# Patient Record
Sex: MALE | Race: WHITE | NOT HISPANIC OR LATINO | Employment: FULL TIME | URBAN - METROPOLITAN AREA
[De-identification: names, ages, dates, MRNs, and addresses within clinical notes are randomized per-mention and may not be internally consistent; named-entity substitution may affect disease eponyms.]

---

## 2020-03-05 PROBLEM — Z72.0 TOBACCO ABUSE: Status: ACTIVE | Noted: 2020-03-05

## 2020-03-05 PROBLEM — J18.9 PNEUMONIA: Status: ACTIVE | Noted: 2020-03-05

## 2020-03-05 PROBLEM — R79.89 ELEVATED TROPONIN: Status: ACTIVE | Noted: 2020-03-05

## 2020-03-05 PROBLEM — J18.9 PNEUMONIA DUE TO INFECTIOUS ORGANISM: Status: ACTIVE | Noted: 2020-03-05

## 2020-03-06 PROBLEM — R91.8 PULMONARY NODULES: Status: ACTIVE | Noted: 2020-03-06

## 2020-03-06 PROBLEM — J96.01 ACUTE HYPOXEMIC RESPIRATORY FAILURE: Status: ACTIVE | Noted: 2020-03-06

## 2020-03-06 PROBLEM — E87.1 HYPONATREMIA: Status: ACTIVE | Noted: 2020-03-06

## 2020-03-07 PROBLEM — K59.00 CONSTIPATION: Status: ACTIVE | Noted: 2020-03-07

## 2020-06-01 PROBLEM — I10 ESSENTIAL HYPERTENSION: Status: ACTIVE | Noted: 2020-06-01

## 2021-03-06 ENCOUNTER — IMMUNIZATION (OUTPATIENT)
Dept: PRIMARY CARE CLINIC | Facility: CLINIC | Age: 61
End: 2021-03-06

## 2021-03-06 DIAGNOSIS — Z23 NEED FOR VACCINATION: Primary | ICD-10-CM

## 2021-03-06 PROCEDURE — 0031A COVID-19,VECTOR-NR,RS-AD26,PF,0.5 ML DOSE VACCINE (JANSSEN): CPT | Mod: CV19,S$GLB,, | Performed by: FAMILY MEDICINE

## 2021-03-06 PROCEDURE — 91303 COVID-19,VECTOR-NR,RS-AD26,PF,0.5 ML DOSE VACCINE (JANSSEN): CPT | Mod: S$GLB,,, | Performed by: FAMILY MEDICINE

## 2021-03-06 PROCEDURE — 91303 COVID-19,VECTOR-NR,RS-AD26,PF,0.5 ML DOSE VACCINE (JANSSEN): ICD-10-PCS | Mod: S$GLB,,, | Performed by: FAMILY MEDICINE

## 2021-03-06 PROCEDURE — 0031A COVID-19,VECTOR-NR,RS-AD26,PF,0.5 ML DOSE VACCINE (JANSSEN): ICD-10-PCS | Mod: CV19,S$GLB,, | Performed by: FAMILY MEDICINE

## 2022-01-09 PROBLEM — U07.1 HYPERCOAGULABLE STATE ASSOCIATED WITH COVID-19: Status: ACTIVE | Noted: 2022-01-09

## 2022-01-09 PROBLEM — U07.1 ACUTE HYPOXEMIC RESPIRATORY FAILURE DUE TO COVID-19: Status: ACTIVE | Noted: 2020-03-06

## 2022-01-09 PROBLEM — D68.69 HYPERCOAGULABLE STATE ASSOCIATED WITH COVID-19: Status: ACTIVE | Noted: 2022-01-09

## 2022-01-09 PROBLEM — B97.89 VIRAL SEPSIS: Status: ACTIVE | Noted: 2022-01-09

## 2022-01-09 PROBLEM — A41.89 VIRAL SEPSIS: Status: ACTIVE | Noted: 2022-01-09

## 2022-01-10 DIAGNOSIS — U07.1 COVID-19 VIRUS DETECTED: ICD-10-CM

## 2023-08-14 PROBLEM — J43.2 CENTRILOBULAR EMPHYSEMA: Status: ACTIVE | Noted: 2023-08-14

## 2023-08-14 PROBLEM — J44.9 COPD, SEVERE: Status: ACTIVE | Noted: 2023-08-14

## 2023-08-14 PROBLEM — F17.200 TOBACCO DEPENDENCE: Status: ACTIVE | Noted: 2023-08-14

## 2025-01-05 PROBLEM — J44.1 COPD EXACERBATION: Status: ACTIVE | Noted: 2025-01-05

## 2025-01-05 PROBLEM — E78.5 HYPERLIPIDEMIA: Status: ACTIVE | Noted: 2025-01-05

## 2025-01-06 PROBLEM — J96.21 ACUTE ON CHRONIC RESPIRATORY FAILURE WITH HYPOXEMIA: Status: ACTIVE | Noted: 2025-01-06

## 2025-04-01 ENCOUNTER — OFFICE VISIT (OUTPATIENT)
Dept: FAMILY MEDICINE | Facility: CLINIC | Age: 65
End: 2025-04-01
Payer: COMMERCIAL

## 2025-04-01 VITALS
BODY MASS INDEX: 23.32 KG/M2 | DIASTOLIC BLOOD PRESSURE: 68 MMHG | RESPIRATION RATE: 18 BRPM | WEIGHT: 153.88 LBS | HEART RATE: 98 BPM | SYSTOLIC BLOOD PRESSURE: 122 MMHG | HEIGHT: 68 IN | OXYGEN SATURATION: 96 %

## 2025-04-01 DIAGNOSIS — Z12.5 SCREENING PSA (PROSTATE SPECIFIC ANTIGEN): ICD-10-CM

## 2025-04-01 DIAGNOSIS — N52.9 ERECTILE DYSFUNCTION, UNSPECIFIED ERECTILE DYSFUNCTION TYPE: ICD-10-CM

## 2025-04-01 DIAGNOSIS — J43.1 PANLOBULAR EMPHYSEMA: ICD-10-CM

## 2025-04-01 DIAGNOSIS — Z12.11 SCREEN FOR COLON CANCER: ICD-10-CM

## 2025-04-01 DIAGNOSIS — R22.2 SUBCUTANEOUS NODULE OF BACK: ICD-10-CM

## 2025-04-01 DIAGNOSIS — R91.8 PULMONARY NODULES: ICD-10-CM

## 2025-04-01 DIAGNOSIS — E78.2 MIXED HYPERLIPIDEMIA: ICD-10-CM

## 2025-04-01 DIAGNOSIS — K80.20 CALCULUS OF GALLBLADDER WITHOUT CHOLECYSTITIS WITHOUT OBSTRUCTION: ICD-10-CM

## 2025-04-01 DIAGNOSIS — R73.09 ELEVATED GLUCOSE: ICD-10-CM

## 2025-04-01 DIAGNOSIS — I25.10 CORONARY ATHEROSCLEROSIS DUE TO CALCIFIED CORONARY LESION: ICD-10-CM

## 2025-04-01 DIAGNOSIS — J96.11 CHRONIC RESPIRATORY FAILURE WITH HYPOXIA: ICD-10-CM

## 2025-04-01 DIAGNOSIS — Z23 IMMUNIZATION DUE: ICD-10-CM

## 2025-04-01 DIAGNOSIS — Z00.00 WELLNESS EXAMINATION: ICD-10-CM

## 2025-04-01 DIAGNOSIS — G47.34 NOCTURNAL OXYGEN DESATURATION: ICD-10-CM

## 2025-04-01 DIAGNOSIS — I25.84 CORONARY ATHEROSCLEROSIS DUE TO CALCIFIED CORONARY LESION: ICD-10-CM

## 2025-04-01 DIAGNOSIS — Z99.81 DEPENDENCE ON NOCTURNAL OXYGEN THERAPY: ICD-10-CM

## 2025-04-01 DIAGNOSIS — I51.89 DIASTOLIC DYSFUNCTION WITHOUT HEART FAILURE: ICD-10-CM

## 2025-04-01 DIAGNOSIS — E78.49 FAMILIAL HYPERLIPIDEMIA: ICD-10-CM

## 2025-04-01 DIAGNOSIS — R53.83 FATIGUE, UNSPECIFIED TYPE: Primary | ICD-10-CM

## 2025-04-01 DIAGNOSIS — M79.89 NODULE OF SOFT TISSUE: ICD-10-CM

## 2025-04-01 PROCEDURE — 99999 PR PBB SHADOW E&M-EST. PATIENT-LVL IV: CPT | Mod: PBBFAC,,, | Performed by: INTERNAL MEDICINE

## 2025-04-01 RX ORDER — SILDENAFIL 100 MG/1
100 TABLET, FILM COATED ORAL DAILY PRN
Qty: 30 TABLET | Refills: 1 | Status: SHIPPED | OUTPATIENT
Start: 2025-04-01 | End: 2026-04-01

## 2025-04-01 NOTE — PROGRESS NOTES
Subjective:       Patient ID: Davide Orta is a 64 y.o. male.    Chief Complaint: Establish Care   HPI     History of Present Illness    CHIEF COMPLAINT:  Patient presents for an initial visit with a new PCP to establish care and discuss ongoing management of his COPD and other health concerns.    HPI:  Patient is a 63-year-old male with a history of COPD. He reports dyspnea on exertion, noting difficulty breathing when walking to his mailbox. He uses nocturnal oxygen therapy, which he has had for approximately 1 year. He recently underwent a sleep study and a 6-minute walk test with his pulmonologist, Dr. Wynn. The results showed SpO2 levels of 86-87-92 on 3 L of continuous oxygen, qualifying him for home oxygen. He is currently awaiting a portable oxygen machine prescription.    He reports attending pulmonary rehabilitation at the [facility], using 3 L of oxygen while on the treadmill. He and his wife purchased a refurbished portable oxygen concentrator for use at the gym, where he exercises 3 days a week, primarily using the treadmill.    He notes two areas of concern: a mass on his right forearm present for about 1-2 years, and another on his upper back that appeared more recently, within the last year.     Regarding sexual health, he reports achieving only partial erections. He has previously used sildenafil at 100mg dosage, which he found effective, and 50mg dosage, which required taking two pills for efficacy.    He denies any current chest pain.    MEDICAL HISTORY:  Patient has a history of COPD, diagnosed in 2020, emphysema, coronary artery disease, pulmonary nodules, and gallstone.   Patient recently received a flu vaccine and a pneumonia vaccine (Prevnar 21) at [facility]. He also had a tetanus booster in 2014.    TEST RESULTS:  In January, the patient's non-fasting glucose level was 140 mg/dL. His cholesterol test in November 2023 showed an LDL of 260 mg/dL, a significant increase from 62 mg/dL  measured five years prior.   Patient underwent a six-minute walk test, achieving SpO2 levels of 86-87-92 on 3 L of continuous oxygen. He also had an overnight sleep test. An echocardiogram was performed, showing good heart squeeze and overall positive results.    IMAGING:  A CT Lung Screen last year revealed emphysema changes, pulmonary nodules, and coronary artery calcifications. I  .         PSA: ordered   Colonoscopy:  defers colon, ordered cologaurd   Immunizations: Flu: Tdap: 2025 Prevnar: 2025 Shingles: due next   Smoker: yes has cut down from 2 packs cessation encouraged// 25' CT emphysema, pulmonary nodules    New to clinic establish care  Multiple various complaints has not seen primary care in years      Complains of soft tissue nodule Right forearm nodule - 1-2 yrs as well as upper mid back.  Has never had imaging.  Like to get workup    ED mild weaker has use p.r.n. Viagra.  Would like to test testosterone     COPD emphysema/respiratory failure:  Nocturnal now recently diagnosed with exercise.  Awaiting portable oxygen machine.  Still smoking.  Rx see list  Exposure to toxic fumes in the past    Mgmt Dr Kingsley wood     65 yo male having smoked 2 ppd for about 40 years and now done to 0.5 ppd for past 5 years       Goes to gym on MWF - using portable oxygen while working out.  Using nocturnal oxygen at 2 L/min.     PFTs 2023 revealed severe obstruction FEV1 of 1.06 L or 32% of predicted.  Lung volumes reveal a mild restrictive physiology with a TLC of 4.25 L or 63% of predicted.  DLCO is severely reduced at 8.17 or 32% c/w severely reduced DLCO which may be 2/2 pulmonary parenchymal abnormalities, pulmonary hypertension, and anemia.    2025  Six minute walk - repeat exercise oximetry in office today.       SpO2% During Exertion on O2: 87               Heart Rate on O2 (bpm): 101                          Exertion O2 LPM: 2  Continuous/Pulse: Continuous     SpO2% During Exertion on O2: 92                Heart Rate on O2 (bpm): 96                          Exertion O2 LPM: 3  Continuous/Pulse: Continuous      Qualifies for home oxygen with exertion   CT chest 10/2024 with stable pulmonary nodules and centrilobular emphysema.    Mixed hyperlipidemia/familial LDL:  Highest .  Cardio recommended Crestor patient defers wife concerned with side effect profile started CholestOff never followed up with additional lipid panel currently not taking cholesterol supplement.  Does have coronary artery calcifications as well as aortic atherosclerosis.    Mgmt Cardio Dr Ardon   Echo shows Diastolic dysfunction with preserved ejection fraction.  Normal ejection fraction, mild aortic sclerosis    CT shows gallstones not symptomatic         ____________________________________________________________________________________________________  Assessment & Plan:  1. Fatigue, unspecified type  - TESTOSTERONE, FREE (DIALYSIS) AND TOTAL, LC/MS/MS; Future  - Follicle Stimulating Hormone; Future    2. Panlobular emphysema    3. Nocturnal oxygen desaturation    4. Chronic respiratory failure with hypoxia    5. Dependence on nocturnal oxygen therapy    6. Nodule of soft tissue  - US Extremity Non Vascular Limited Right; Future    7. Erectile dysfunction, unspecified erectile dysfunction type  - TESTOSTERONE, FREE (DIALYSIS) AND TOTAL, LC/MS/MS; Future  - Follicle Stimulating Hormone; Future  - sildenafiL (VIAGRA) 100 MG tablet; Take 1 tablet (100 mg total) by mouth daily as needed for Erectile Dysfunction.  Dispense: 30 tablet; Refill: 1    8. Elevated glucose  - Hemoglobin A1C; Future    9. Subcutaneous nodule of back  - US Soft Tissue Chest_Upper Back; Future    10. Mixed hyperlipidemia  - Lipid Panel; Future  - Lipoprotein A (LPA); Future    11. Diastolic dysfunction without heart failure    12. Coronary atherosclerosis due to calcified coronary lesion    13. Pulmonary nodules    14. Calculus of gallbladder without cholecystitis  without obstruction    15. Wellness examination  - Hemoglobin A1C; Future  - Lipid Panel; Future  - TSH; Future  - PSA, Screening; Future  - Lipoprotein A (LPA); Future  - TESTOSTERONE, FREE (DIALYSIS) AND TOTAL, LC/MS/MS; Future  - Follicle Stimulating Hormone; Future    16. Screening PSA (prostate specific antigen)  - PSA, Screening; Future    17. Screen for colon cancer  - Cologuard Screening (Multitarget Stool DNA); Future  - Cologuard Screening (Multitarget Stool DNA)    18. Immunization due  - DIPH,PERTUSS(ACEL),TET VAC(PF)(ADULT)(ADACEL)(TDaP)    19. Familial hyperlipidemia     Fatigue, unspecified type  -     TESTOSTERONE, FREE (DIALYSIS) AND TOTAL, LC/MS/MS; Future; Expected date: 04/01/2025  -     Follicle Stimulating Hormone; Future; Expected date: 04/01/2025    Panlobular emphysema    Nocturnal oxygen desaturation    Chronic respiratory failure with hypoxia    Dependence on nocturnal oxygen therapy    Nodule of soft tissue  -     US Extremity Non Vascular Limited Right; Future; Expected date: 04/01/2025    Erectile dysfunction, unspecified erectile dysfunction type  -     TESTOSTERONE, FREE (DIALYSIS) AND TOTAL, LC/MS/MS; Future; Expected date: 04/01/2025  -     Follicle Stimulating Hormone; Future; Expected date: 04/01/2025  -     sildenafiL (VIAGRA) 100 MG tablet; Take 1 tablet (100 mg total) by mouth daily as needed for Erectile Dysfunction.  Dispense: 30 tablet; Refill: 1    Elevated glucose  -     Hemoglobin A1C; Future; Expected date: 04/01/2025    Subcutaneous nodule of back  -     US Soft Tissue Chest_Upper Back; Future; Expected date: 04/01/2025    Mixed hyperlipidemia  -     Lipid Panel; Future; Expected date: 04/01/2025  -     Lipoprotein A (LPA); Future; Expected date: 04/01/2025    Diastolic dysfunction without heart failure    Coronary atherosclerosis due to calcified coronary lesion    Pulmonary nodules    Calculus of gallbladder without cholecystitis without obstruction    Wellness  examination  -     Hemoglobin A1C; Future; Expected date: 04/01/2025  -     Lipid Panel; Future; Expected date: 04/01/2025  -     TSH; Future; Expected date: 04/01/2025  -     PSA, Screening; Future; Expected date: 04/01/2025  -     Lipoprotein A (LPA); Future; Expected date: 04/01/2025  -     TESTOSTERONE, FREE (DIALYSIS) AND TOTAL, LC/MS/MS; Future; Expected date: 04/01/2025  -     Follicle Stimulating Hormone; Future; Expected date: 04/01/2025    Screening PSA (prostate specific antigen)  -     PSA, Screening; Future; Expected date: 04/01/2025    Screen for colon cancer  -     Cologuard Screening (Multitarget Stool DNA); Future; Expected date: 04/01/2025    Immunization due  -     DIPH,PERTUSS(ACEL),TET VAC(PF)(ADULT)(ADACEL)(TDaP)    Familial hyperlipidemia        Continue to work on maintain healthy weight, balanced diet. Avoid unhealthy habits: smoking/vaping, excessive alcohol intake.     Recommend diet exercise:  High protein, low fat, low carb diet - calories women under <1200 & men <1800, carbs <100 G, protein 50-60 G daily. Protein supplements to replace one meal.  Keep all beverages < 10 calories per serving. Keep snacks < 100 calories.   Recommend exercise 160 minutes per week, combo of cardio and weight/strength training.     Visit today included increased complexity associated with the care of the episodic problem addressed and managing the longitudinal care of the patient due to the serious and/or complex managed problem(s). Copd       Disclaimer: This note was partly generated using dictation software which may occasionally result in transcription errors  ____________________________________________________________________________________________________  Review of Systems:  Review of Systems   Constitutional:  Positive for fatigue. Negative for chills.   HENT:  Negative for drooling.    Eyes:  Negative for pain.   Respiratory:  Positive for shortness of breath. Negative for choking.   "  Cardiovascular:  Negative for chest pain.   Gastrointestinal:  Negative for blood in stool.   Genitourinary:  Negative for hematuria.   Musculoskeletal:  Negative for joint swelling.   Skin:  Negative for pallor.   Neurological:  Negative for facial asymmetry.   Psychiatric/Behavioral:  Negative for confusion.            Objective:     Wt Readings from Last 3 Encounters:   04/01/25 69.8 kg (153 lb 14.1 oz)   03/25/25 68 kg (150 lb)   02/17/25 68 kg (150 lb)     BP Readings from Last 3 Encounters:   04/01/25 122/68   03/25/25 122/68   02/11/25 120/70       Lab Results   Component Value Date    WBC 8.03 01/07/2025    HGB 14.2 01/07/2025    HCT 45.0 01/07/2025     01/07/2025     01/07/2025    K 4.8 01/07/2025     01/07/2025    ALT 14 01/07/2025    AST 26 01/07/2025    CO2 31 (H) 01/07/2025    CREATININE 0.66 01/07/2025    BUN 14 01/07/2025     (H) 01/07/2025      No results found for: "HGBA1C"   No results found for: "TSH"  No results found for: "FREET4"  Lab Results   Component Value Date    LDLCALC 260.0 (H) 11/21/2023    LDLCALC 62.4 (L) 03/07/2020     Lab Results   Component Value Date    TRIG 245 (H) 11/21/2023    TRIG 173 (H) 03/07/2020            Physical Exam  Constitutional:       Appearance: Normal appearance.      Comments: Not wearing oxygen on exam   HENT:      Head: Normocephalic and atraumatic.   Eyes:      Extraocular Movements: Extraocular movements intact.      Conjunctiva/sclera: Conjunctivae normal.      Pupils: Pupils are equal, round, and reactive to light.   Cardiovascular:      Rate and Rhythm: Normal rate.   Pulmonary:      Effort: Pulmonary effort is normal.   Skin:            Comments: 2 areas soft tissue lesion, 1 cm.  Appear to be lipomas.   Neurological:      Mental Status: He is alert and oriented to person, place, and time.   Psychiatric:         Mood and Affect: Mood normal.               Medication List with Changes/Refills   New Medications    SILDENAFIL " (VIAGRA) 100 MG TABLET    Take 1 tablet (100 mg total) by mouth daily as needed for Erectile Dysfunction.   Current Medications    ALBUTEROL (PROVENTIL/VENTOLIN HFA) 90 MCG/ACTUATION INHALER    INHALE 2 PUFFS INTO THE LUNGS EVERY 6 HOURS AS NEEDED FOR WHEEZING OR SHORTNESS OF BREATH RESCUE    ALBUTEROL-IPRATROPIUM (DUO-NEB) 2.5 MG-0.5 MG/3 ML NEBULIZER SOLUTION    Take 3 mLs by nebulization every 6 (six) hours as needed for Wheezing or Shortness of Breath (chest tightness). Rescue    BUDESONIDE (PULMICORT) 0.5 MG/2 ML NEBULIZER SOLUTION    Take 0.5 mg by nebulization 2 (two) times daily as needed (for wheezing and shortness of breath).    FLUTICASONE-UMECLIDIN-VILANTER (TRELEGY ELLIPTA) 100-62.5-25 MCG DSDV    Inhale 1 puff into the lungs once daily.    PREDNISONE (DELTASONE) 20 MG TABLET    Take 1 tablet (20 mg total) by mouth once daily.    SODIUM CHLORIDE 3% 3 % NEBULIZER SOLUTION    Take 4 mLs by nebulization 2 (two) times a day.

## 2025-04-16 ENCOUNTER — RESULTS FOLLOW-UP (OUTPATIENT)
Dept: FAMILY MEDICINE | Facility: CLINIC | Age: 65
End: 2025-04-16

## 2025-04-21 ENCOUNTER — HOSPITAL ENCOUNTER (OUTPATIENT)
Dept: RADIOLOGY | Facility: HOSPITAL | Age: 65
Discharge: HOME | End: 2025-04-21
Attending: INTERNAL MEDICINE
Payer: COMMERCIAL

## 2025-04-21 DIAGNOSIS — M79.89 NODULE OF SOFT TISSUE: ICD-10-CM

## 2025-04-21 DIAGNOSIS — R22.2 SUBCUTANEOUS NODULE OF BACK: ICD-10-CM

## 2025-04-21 PROCEDURE — 76882 US LMTD JT/FCL EVL NVASC XTR: CPT | Mod: TC,PO,RT

## 2025-04-21 PROCEDURE — 76604 US EXAM CHEST: CPT | Mod: TC,PO

## 2025-04-21 PROCEDURE — 76882 US LMTD JT/FCL EVL NVASC XTR: CPT | Mod: 26,RT,, | Performed by: RADIOLOGY

## 2025-04-21 PROCEDURE — 76604 US EXAM CHEST: CPT | Mod: 26,,, | Performed by: RADIOLOGY

## 2025-04-22 ENCOUNTER — LAB VISIT (OUTPATIENT)
Dept: LAB | Facility: HOSPITAL | Age: 65
End: 2025-04-22
Attending: INTERNAL MEDICINE
Payer: COMMERCIAL

## 2025-04-22 DIAGNOSIS — J44.9 CHRONIC OBSTRUCTIVE PULMONARY DISEASE, UNSPECIFIED COPD TYPE: ICD-10-CM

## 2025-04-22 DIAGNOSIS — R73.09 ELEVATED GLUCOSE: ICD-10-CM

## 2025-04-22 DIAGNOSIS — J43.9 PULMONARY EMPHYSEMA, UNSPECIFIED EMPHYSEMA TYPE: ICD-10-CM

## 2025-04-22 DIAGNOSIS — E78.2 MIXED HYPERLIPIDEMIA: ICD-10-CM

## 2025-04-22 DIAGNOSIS — Z12.5 SCREENING PSA (PROSTATE SPECIFIC ANTIGEN): ICD-10-CM

## 2025-04-22 DIAGNOSIS — R53.83 FATIGUE, UNSPECIFIED TYPE: ICD-10-CM

## 2025-04-22 DIAGNOSIS — N52.9 ERECTILE DYSFUNCTION, UNSPECIFIED ERECTILE DYSFUNCTION TYPE: ICD-10-CM

## 2025-04-22 DIAGNOSIS — Z00.00 WELLNESS EXAMINATION: ICD-10-CM

## 2025-04-22 LAB
A1AT SERPL-MCNC: 190 MG/DL (ref 100–190)
CHOLEST SERPL-MCNC: 241 MG/DL (ref 120–199)
CHOLEST/HDLC SERPL: 5.9 {RATIO} (ref 2–5)
EAG (OHS): 114 MG/DL (ref 68–131)
FSH SERPL-ACNC: 7.78 MIU/ML (ref 0.95–11.95)
HBA1C MFR BLD: 5.6 % (ref 4–5.6)
HDLC SERPL-MCNC: 41 MG/DL (ref 40–75)
HDLC SERPL: 17 % (ref 20–50)
LDLC SERPL CALC-MCNC: 168.8 MG/DL (ref 63–159)
NONHDLC SERPL-MCNC: 200 MG/DL
PSA SERPL-MCNC: 2.22 NG/ML
TRIGL SERPL-MCNC: 156 MG/DL (ref 30–150)
TSH SERPL-ACNC: 2.17 UIU/ML (ref 0.4–4)

## 2025-04-22 PROCEDURE — 80061 LIPID PANEL: CPT

## 2025-04-22 PROCEDURE — 82103 ALPHA-1-ANTITRYPSIN TOTAL: CPT

## 2025-04-22 PROCEDURE — 84153 ASSAY OF PSA TOTAL: CPT

## 2025-04-22 PROCEDURE — 83695 ASSAY OF LIPOPROTEIN(A): CPT

## 2025-04-22 PROCEDURE — 36415 COLL VENOUS BLD VENIPUNCTURE: CPT | Mod: PN

## 2025-04-22 PROCEDURE — 83036 HEMOGLOBIN GLYCOSYLATED A1C: CPT

## 2025-04-22 PROCEDURE — 84402 ASSAY OF FREE TESTOSTERONE: CPT

## 2025-04-22 PROCEDURE — 84443 ASSAY THYROID STIM HORMONE: CPT

## 2025-04-22 PROCEDURE — 83001 ASSAY OF GONADOTROPIN (FSH): CPT

## 2025-04-23 ENCOUNTER — PATIENT MESSAGE (OUTPATIENT)
Dept: FAMILY MEDICINE | Facility: CLINIC | Age: 65
End: 2025-04-23
Payer: COMMERCIAL

## 2025-04-25 LAB — W LP(A): 25 MG/DL

## 2025-05-01 LAB
W TESTOSTERONE, FREE: 71.7 PG/ML
W TESTOSTERONE, TOTAL: 648 NG/DL

## 2025-05-05 ENCOUNTER — OFFICE VISIT (OUTPATIENT)
Dept: FAMILY MEDICINE | Facility: CLINIC | Age: 65
End: 2025-05-05
Payer: COMMERCIAL

## 2025-05-05 VITALS
OXYGEN SATURATION: 96 % | WEIGHT: 153.56 LBS | HEART RATE: 82 BPM | SYSTOLIC BLOOD PRESSURE: 122 MMHG | DIASTOLIC BLOOD PRESSURE: 80 MMHG | BODY MASS INDEX: 23.27 KG/M2 | HEIGHT: 68 IN

## 2025-05-05 DIAGNOSIS — Z11.59 NEED FOR HEPATITIS C SCREENING TEST: ICD-10-CM

## 2025-05-05 DIAGNOSIS — R73.02 GLUCOSE INTOLERANCE (IMPAIRED GLUCOSE TOLERANCE): ICD-10-CM

## 2025-05-05 DIAGNOSIS — I25.84 CORONARY ATHEROSCLEROSIS DUE TO CALCIFIED CORONARY LESION: ICD-10-CM

## 2025-05-05 DIAGNOSIS — Z00.00 WELLNESS EXAMINATION: Primary | ICD-10-CM

## 2025-05-05 DIAGNOSIS — I25.10 CORONARY ATHEROSCLEROSIS DUE TO CALCIFIED CORONARY LESION: ICD-10-CM

## 2025-05-05 DIAGNOSIS — E78.49 FAMILIAL COMBINED HYPERLIPIDEMIA: ICD-10-CM

## 2025-05-05 DIAGNOSIS — E78.2 MIXED HYPERLIPIDEMIA: ICD-10-CM

## 2025-05-05 DIAGNOSIS — Z23 IMMUNIZATION DUE: ICD-10-CM

## 2025-05-05 DIAGNOSIS — M79.10 MYALGIA: ICD-10-CM

## 2025-05-05 PROCEDURE — 99999 PR PBB SHADOW E&M-EST. PATIENT-LVL IV: CPT | Mod: PBBFAC,,, | Performed by: INTERNAL MEDICINE

## 2025-05-05 RX ORDER — ZOSTER VACCINE RECOMBINANT, ADJUVANTED 50 MCG/0.5
0.5 KIT INTRAMUSCULAR ONCE
Qty: 1 EACH | Refills: 0 | Status: SHIPPED | OUTPATIENT
Start: 2025-05-05 | End: 2025-05-05

## 2025-05-05 NOTE — PROGRESS NOTES
Subjective:       Patient ID: Davide Orta is a 64 y.o. male.    Chief Complaint: Annual Exam   HPI     History of Present Illness    CHIEF COMPLAINT:  Patient presents today to discuss results of recent tests wellness. , including cholesterol levels and imaging studies.    HPI:  Patient's recent lab results show improvements in cholesterol levels. His total cholesterol decreased from 357 to 300, triglycerides reduced from 245 to 156, and LDL cholesterol lowered from 260 to 168. He reports a history of high cholesterol and has been taking natural vitamin supplements recommended by his spouse to lower his cholesterol levels. He fasted for 12 hours before the most recent labs.  Wife discourage is his use of statins due to side effect profile.  He has never taking cholesterol medicine.      He uses oxygen therapy, sleeping with continuous oxygen at 2 L per night. He also uses a portable oxygen concentrator set at 3 L when exercising at the gym. During a previous smoking cessation rehab program at [facility], he used 3 L of oxygen while walking on a treadmill. He sometimes increases the portable concentrator to 4 L during workouts to improve his recovery time between exercises.    He has a lipoma on his upper back, located at the bottom of his shoulder blade between the shoulder blades. It measures 2.3 cm and is not painful. He expresses some concern about its presence.  Also right upper extremity also lipoma patient wants to monitor for now    A recent CT of the lungs revealed emphysema changes and small nodules, which are being monitored by Dr. Wynn.   The scan also showed coronary calcium buildup in his coronary arteries.    He has previously been evaluated by a cardiologist, Dr. Ardon, who was primarily concerned about his cholesterol levels.  Recommended statin to lower cholesterol levels as well with hospitalization patient defers for now.     He denies chest pain, heart flutters, or chronic  palpitations. He also denies a personal history of diabetes or heart attacks.    MEDICAL HISTORY:  Patient has a history of emphysema and hypercholesterolemia.        PSA: 2.2 ((4/25   Colonoscopy:  4/25 negative cologaurd   Immunizations: Flu: Tdap: 2025 Prevnar: 2025 Shingles:  Recommend  Smoker: yes cessation encouraged// 25' CT emphysema, pulmonary nodules. Mgmt w Pulm        Lab review follow-up    A1c 5.6 glucose 140 prev   Fasting glucose wasn't done       Lipomas on ultrasound recently completed. soft tissue nodule Right forearm nodule - 1-2 yrs as well as upper mid back.       ED mild weaker Rx prn Viagra testosterone was normal     COPD emphysema/chronic respiratory failure:  Nocturnal O2 2 L and pulse dose oxygen for exercise. Still smoking.    Exposure to toxic fumes past.               Mgmt Dr Wynn pulm             PFTs 2023 severe obstruction FEV1 of 1.06 L or 32% of predicted.  Lung volumes mild restrictive TLC of 4.25 L or 63% of predicted.  DLCO severely reduced at 8.17 or 32% c/w severely reduced DLCO         Coronary atherosclerosis/Mixed hyperlipidemia/familial LDL: LDL goal ,70 //  Highest .  Recheck 168, 156  /defers    The 10-year ASCVD risk score (Rosales DK, et al., 2019) is: 19%    Values used to calculate the score:      Age: 64 years      Sex: Male      Is Non- : No      Diabetic: No      Tobacco smoker: Yes      Systolic Blood Pressure: 122 mmHg      Is BP treated: No      HDL Cholesterol: 41 mg/dL      Total Cholesterol: 241 mg/dL  Mgmt Cardio Dr Ardon.  Pt using CholestOff              Echo shows Diastolic dysfunction with preserved ejection fraction.  Normal ejection fraction, mild aortic sclerosis     CT shows gallstones not symptomatic             ____________________________________________________________________________________________________  Assessment & Plan:  1. Wellness examination  - Comprehensive Metabolic Panel; Future  - Lipid Panel;  Future  - Hemoglobin A1C; Future  - Hepatitis C Antibody; Future    2. Familial combined hyperlipidemia    3. Coronary atherosclerosis due to calcified coronary lesion    4. Mixed hyperlipidemia  - Comprehensive Metabolic Panel; Future  - Lipid Panel; Future    5. Glucose intolerance (impaired glucose tolerance)  - Hemoglobin A1C; Future    6. Need for hepatitis C screening test  - Hepatitis C Antibody; Future    7. Myalgia    8. Immunization due  - varicella-zoster gE-AS01B, PF, (SHINGRIX, PF,) 50 mcg/0.5 mL injection; Inject 0.5 mLs into the muscle once. for 1 dose  Dispense: 1 each; Refill: 0     Wellness examination  -     Comprehensive Metabolic Panel; Future; Expected date: 05/05/2025  -     Lipid Panel; Future; Expected date: 05/05/2025  -     Hemoglobin A1C; Future; Expected date: 05/05/2025  -     Hepatitis C Antibody; Future; Expected date: 05/05/2025    Familial combined hyperlipidemia    Coronary atherosclerosis due to calcified coronary lesion    Mixed hyperlipidemia  -     Comprehensive Metabolic Panel; Future; Expected date: 05/05/2025  -     Lipid Panel; Future; Expected date: 05/05/2025    Glucose intolerance (impaired glucose tolerance)  -     Hemoglobin A1C; Future; Expected date: 05/05/2025    Need for hepatitis C screening test  -     Hepatitis C Antibody; Future; Expected date: 05/05/2025    Myalgia    Immunization due  -     varicella-zoster gE-AS01B, PF, (SHINGRIX, PF,) 50 mcg/0.5 mL injection; Inject 0.5 mLs into the muscle once. for 1 dose  Dispense: 1 each; Refill: 0        Continue to work on maintain healthy weight, balanced diet. Avoid unhealthy habits: smoking/vaping, excessive alcohol intake.     Recommend diet exercise:  High protein, low fat, low carb diet - calories women under <1200 & men <1800, carbs <100 G, protein 50-60 G daily. Protein supplements to replace one meal.  Keep all beverages < 10 calories per serving. Keep snacks < 100 calories.   Recommend exercise 160 minutes  "per week, combo of cardio and weight/strength training.     Visit today included increased complexity associated with the care of the episodic problem addressed and managing the longitudinal care of the patient due to the serious and/or complex managed problem(s). HTN      Disclaimer: This note was partly generated using dictation software which may occasionally result in transcription errors  ____________________________________________________________________________________________________  Review of Systems:  Review of Systems      Negative     Objective:     Wt Readings from Last 3 Encounters:   05/05/25 69.7 kg (153 lb 8.8 oz)   04/01/25 69.8 kg (153 lb 14.1 oz)   03/25/25 68 kg (150 lb)     BP Readings from Last 3 Encounters:   05/05/25 122/80   04/01/25 122/68   03/25/25 122/68       Lab Results   Component Value Date    WBC 8.03 01/07/2025    HGB 14.2 01/07/2025    HCT 45.0 01/07/2025     01/07/2025     01/07/2025    K 4.8 01/07/2025     01/07/2025    ALT 14 01/07/2025    AST 26 01/07/2025    CO2 31 (H) 01/07/2025    CREATININE 0.66 01/07/2025    BUN 14 01/07/2025    PSA 2.22 04/22/2025     (H) 01/07/2025      Hemoglobin A1c   Date Value Ref Range Status   04/22/2025 5.6 4.0 - 5.6 % Final     Comment:     ADA Screening Guidelines:  5.7-6.4%  Consistent with prediabetes  >=6.5%  Consistent with diabetes    High levels of fetal hemoglobin interfere with the HbA1C  assay. Heterozygous hemoglobin variants (HbS, HgC, etc)do  not significantly interfere with this assay.   However, presence of multiple variants may affect accuracy.      Lab Results   Component Value Date    TSH 2.168 04/22/2025     No results found for: "FREET4"  Lab Results   Component Value Date    LDLCALC 168.8 (H) 04/22/2025    LDLCALC 260.0 (H) 11/21/2023    LDLCALC 62.4 (L) 03/07/2020     Lab Results   Component Value Date    TRIG 156 (H) 04/22/2025    TRIG 245 (H) 11/21/2023    TRIG 173 (H) 03/07/2020        "     Physical Exam      Constitutional:       Appearance: Normal appearance.   HENT:      Head: Normocephalic and atraumatic.   Eyes:      Extraocular Movements: Extraocular movements intact.      Pupils: Pupils are equal, round, and reactive to light.   Cardiovascular:      Rate and Rhythm: Normal rate.   Pulmonary:      Effort: Pulmonary effort is normal.   Neurological:      Mental Status: She is alert and oriented to person, place, and time.   Psychiatric:         Mood and Affect: Mood normal.     Medication List with Changes/Refills   New Medications    VARICELLA-ZOSTER GE-AS01B, PF, (SHINGRIX, PF,) 50 MCG/0.5 ML INJECTION    Inject 0.5 mLs into the muscle once. for 1 dose   Current Medications    ALBUTEROL (PROVENTIL/VENTOLIN HFA) 90 MCG/ACTUATION INHALER    INHALE 2 PUFFS INTO THE LUNGS EVERY 6 HOURS AS NEEDED FOR WHEEZING OR SHORTNESS OF BREATH RESCUE    ALBUTEROL-IPRATROPIUM (DUO-NEB) 2.5 MG-0.5 MG/3 ML NEBULIZER SOLUTION    Take 3 mLs by nebulization every 6 (six) hours as needed for Wheezing or Shortness of Breath (chest tightness). Rescue    BUDESONIDE (PULMICORT) 0.5 MG/2 ML NEBULIZER SOLUTION    Take 0.5 mg by nebulization 2 (two) times daily as needed (for wheezing and shortness of breath).    FLUTICASONE-UMECLIDIN-VILANTER (TRELEGY ELLIPTA) 100-62.5-25 MCG DSDV    Inhale 1 puff into the lungs once daily.    PREDNISONE (DELTASONE) 20 MG TABLET    Take 1 tablet (20 mg total) by mouth once daily.    SILDENAFIL (VIAGRA) 100 MG TABLET    Take 1 tablet (100 mg total) by mouth daily as needed for Erectile Dysfunction.    SODIUM CHLORIDE 3% 3 % NEBULIZER SOLUTION    Take 4 mLs by nebulization 2 (two) times a day.

## 2025-05-26 DIAGNOSIS — N52.9 ERECTILE DYSFUNCTION, UNSPECIFIED ERECTILE DYSFUNCTION TYPE: ICD-10-CM

## 2025-05-26 RX ORDER — SILDENAFIL 100 MG/1
100 TABLET, FILM COATED ORAL DAILY PRN
Qty: 30 TABLET | Refills: 5 | Status: SHIPPED | OUTPATIENT
Start: 2025-05-26

## 2025-05-26 NOTE — TELEPHONE ENCOUNTER
No care due was identified.  [facility] Embedded Care Due Messages. Reference number: [id number].   5/26/2025 12:10:34 AM CDT

## 2025-05-26 NOTE — TELEPHONE ENCOUNTER
Refill Routing Note   Medication(s) are not appropriate for processing by [facility] for the following reason(s):        New or recently adjusted medication    [facility] action(s):  Defer             Appointments  past 12m or future 3m with PCP    Date Provider   Last Visit   5/5/2025 Ghulam Griggs MD   Next Visit   11/5/2025 Ghulam Griggs MD   ED visits in past 90 days: 0        Note composed:3:36 PM 05/26/2025

## 2025-07-22 ENCOUNTER — PATIENT MESSAGE (OUTPATIENT)
Dept: FAMILY MEDICINE | Facility: CLINIC | Age: 65
End: 2025-07-22
Payer: COMMERCIAL